# Patient Record
Sex: FEMALE | Race: OTHER | HISPANIC OR LATINO | URBAN - METROPOLITAN AREA
[De-identification: names, ages, dates, MRNs, and addresses within clinical notes are randomized per-mention and may not be internally consistent; named-entity substitution may affect disease eponyms.]

---

## 2017-10-24 ENCOUNTER — EMERGENCY (EMERGENCY)
Facility: HOSPITAL | Age: 47
LOS: 1 days | Discharge: HOME | End: 2017-10-24
Attending: EMERGENCY MEDICINE | Admitting: EMERGENCY MEDICINE
Payer: MEDICAID

## 2017-10-24 VITALS
OXYGEN SATURATION: 100 % | DIASTOLIC BLOOD PRESSURE: 45 MMHG | HEART RATE: 62 BPM | RESPIRATION RATE: 16 BRPM | TEMPERATURE: 98 F | SYSTOLIC BLOOD PRESSURE: 112 MMHG

## 2017-10-24 DIAGNOSIS — Z98.82 BREAST IMPLANT STATUS: Chronic | ICD-10-CM

## 2017-10-24 LAB
HIV1 AG SER QL: SIGNIFICANT CHANGE UP
HIV1+2 AB SPEC QL: SIGNIFICANT CHANGE UP

## 2017-10-24 PROCEDURE — 73030 X-RAY EXAM OF SHOULDER: CPT | Mod: 26,LT

## 2017-10-24 PROCEDURE — 93010 ELECTROCARDIOGRAM REPORT: CPT

## 2017-10-24 PROCEDURE — 99284 EMERGENCY DEPT VISIT MOD MDM: CPT | Mod: 25

## 2017-10-24 RX ORDER — IBUPROFEN 200 MG
600 TABLET ORAL ONCE
Qty: 0 | Refills: 0 | Status: COMPLETED | OUTPATIENT
Start: 2017-10-24 | End: 2017-10-24

## 2017-10-24 RX ORDER — ACETAMINOPHEN 500 MG
975 TABLET ORAL ONCE
Qty: 0 | Refills: 0 | Status: COMPLETED | OUTPATIENT
Start: 2017-10-24 | End: 2017-10-24

## 2017-10-24 RX ADMIN — Medication 600 MILLIGRAM(S): at 12:39

## 2017-10-24 RX ADMIN — Medication 975 MILLIGRAM(S): at 12:39

## 2017-10-24 NOTE — ED PROVIDER NOTE - MEDICAL DECISION MAKING DETAILS
47yoF LHD pt sent from urgent care for concern of left shoulder pain with mild radiation to chest. I do not believe that this is cardiac in nature, screening EKG performed was non-concerning and the patient's history is musculoskeletal in nature. Exam elevates concern for possible ac seperation vs msk strain/sprain vs ligamentous injury. XR to r/o seperations / bony abnormality, will give f/u ortho for ongoing complaints, APAP and ibuprofen here for pain control. Patient consents to HIV testing and this is performed. 46 yo F LHD pt sent from urgent care for concern of left shoulder pain with mild radiation to chest. I do not believe that this is cardiac in nature, screening EKG performed was non-concerning and the patient's history is musculoskeletal in nature. Exam elevates concern for possible ac seperation vs msk strain/sprain vs ligamentous injury. XR to r/o separation / bony abnormality, will give f/u ortho for ongoing complaints, APAP and ibuprofen here for pain control. Patient consents to HIV testing and this is performed.

## 2017-10-24 NOTE — ED PROVIDER NOTE - PROGRESS NOTE DETAILS
Pt reports that her shoulder feels significantly improved with the APAP / ibuprofen. HIV results still not in, pt reports that she would prefer to leave and can callback as needed for results. CELIO Márquez PGY1 Pt reports that her shoulder feels significantly improved with the APAP / ibuprofen. HIV results still not in, pt reports that she would prefer to leave and can callback as needed for results. Xrays result negative for acute pathology. Will give f/u ortho. CELIO Márquez PGY1 HIV results negative, called in result to the patient. Pt is Afghan speaking, results relayed to  which pt had reported would be okay to do. CELIO Márquez PGY1

## 2017-10-24 NOTE — ED PROVIDER NOTE - MUSCULOSKELETAL MINIMAL EXAM
left shoulder with anterior tenderness as well as tenderness to palpation of the deltoid diffusely. There is AC joint tenderness to palp, but no feeling of seperation./normal range of motion/TENDERNESS/atraumatic

## 2017-10-24 NOTE — ED ADULT TRIAGE NOTE - CHIEF COMPLAINT QUOTE
pt with a c/o chest pain radiating to the the left arm. pt sent from urgent care for further evaluation.

## 2017-10-24 NOTE — ED PROVIDER NOTE - OBJECTIVE STATEMENT
L shoulder pain 4-5 days onset after twisting motion of her core. Notes that it is worse with movement and lifting at the shoulder. There is mild radiation towards her upper shoulder and left chest. She *** Left hand dominant pt presenting for shoulder pain 4-5 days onset after twisting motion of her core. Notes that it is worse with movement and lifting at the shoulder. There is mild radiation towards her upper shoulder and left chest. She has not had worsening of the pain with walking or moving her legs. She does not have worsened pain with breathing or deep breathing. She has not felt light headed or had any leg pain. She notes that the pain is worsened when she lifts her left arm, especially laterally. She has not been able to lift heavy objects second to pain. She denies trauma to the arm and has not felt as though her shoulder dislocated. She has been able to move the arm but with significant pain.     Patient is [place] speaking, her  is translating over the phone, he speaks fluent English and Uzbek. Left hand dominant pt presenting for shoulder pain 4-5 days onset after twisting motion of her core. Notes that it is worse with movement and lifting at the shoulder. There is mild radiation towards her upper shoulder and left chest. She has not had worsening of the pain with walking or moving her legs. She does not have worsened pain with breathing or deep breathing. She has not felt light headed or had any leg pain. She notes that the pain is worsened when she lifts her left arm, especially laterally. She has not been able to lift heavy objects second to pain. She denies trauma to the arm and has not felt as though her shoulder dislocated. She has been able to move the arm but with significant pain.   Patient is [place] speaking, her  is translating over the phone, he speaks fluent English and [place].

## 2017-10-24 NOTE — ED PROVIDER NOTE - ATTENDING CONTRIBUTION TO CARE
ED Attending Dr. Payton: 46 yo left hand dominant female with no sig PMH in ED with acute onset left shoulder pain beginning after twisting movement 5 days ago.  Pain radiates from left shoulder into left neck/chest but pt denies chest pain.  Pain worse with abducting left UE above 90 degrees.  No associated SOB, diaphoresis, dizziness or N/V/D.  Pt responding well to ibuprofen at home but recently stopped taking it and pain returned.  On exam pt well appearing, in NAD, heart RRR, lungs CTAB, abd NTND, extremities without swelling, strength 5/5 in all extremities and skin without rash.  Left shoulder without deformity but TTP overlying approx ?AC joint and pain significantly worse with abduction above 90 degrees, although ROM intact with pain.  Left lateral paracervical neck musculature TTP but no midline neck TTP.  EKG WNL.  Consistent with musculoskeletal pain, very low suspicion for ACS (no known risk factors [pt adopted and does not know FHx]).  Will get xray to eval for AC separation, give NSAIDs, re-eval.  Pt offered translation services but requesting  to translate via telephone.

## 2017-10-24 NOTE — ED PROVIDER NOTE - PLAN OF CARE
Take acetaminophen (Tylenol) 650mg (2 tablets) up to 4 times per day as needed for pain. Never take more than 3000mg of acetaminophen/Tylenol in any 24 hour period. Take ibuprofen (or Motrin) 600mg (3 tablets) up to 4 times per day as needed for pain with food or milk. Follow up with the orthopedics clinic for further evaluation as needed. Return here to the emergency department for any new concerns.

## 2017-10-24 NOTE — ED PROVIDER NOTE - CARE PLAN
Principal Discharge DX:	Acute pain of left shoulder Principal Discharge DX:	Acute pain of left shoulder  Instructions for follow-up, activity and diet:	Take acetaminophen (Tylenol) 650mg (2 tablets) up to 4 times per day as needed for pain. Never take more than 3000mg of acetaminophen/Tylenol in any 24 hour period. Take ibuprofen (or Motrin) 600mg (3 tablets) up to 4 times per day as needed for pain with food or milk. Follow up with the orthopedics clinic for further evaluation as needed. Return here to the emergency department for any new concerns.

## 2017-10-24 NOTE — ED ADULT NURSE REASSESSMENT NOTE - NS ED NURSE REASSESS COMMENT FT1
Intake pt coming with left arm and shoulder pain x 2-3 days pt stated was lifting during the weekend denies trauma, denies numbness/weakness to upper extremities pt ambulatory to ER.  Medicated as ordered pending results.    Agustina Rayo RN

## 2018-01-30 ENCOUNTER — EMERGENCY (EMERGENCY)
Facility: HOSPITAL | Age: 48
LOS: 1 days | Discharge: HOME | End: 2018-01-30
Attending: EMERGENCY MEDICINE | Admitting: EMERGENCY MEDICINE
Payer: MEDICAID

## 2018-01-30 VITALS
TEMPERATURE: 98 F | RESPIRATION RATE: 18 BRPM | OXYGEN SATURATION: 100 % | SYSTOLIC BLOOD PRESSURE: 98 MMHG | HEART RATE: 80 BPM | DIASTOLIC BLOOD PRESSURE: 62 MMHG

## 2018-01-30 VITALS
SYSTOLIC BLOOD PRESSURE: 107 MMHG | OXYGEN SATURATION: 100 % | DIASTOLIC BLOOD PRESSURE: 58 MMHG | HEART RATE: 78 BPM | RESPIRATION RATE: 16 BRPM | TEMPERATURE: 98 F

## 2018-01-30 DIAGNOSIS — Z98.51 TUBAL LIGATION STATUS: Chronic | ICD-10-CM

## 2018-01-30 DIAGNOSIS — Z98.82 BREAST IMPLANT STATUS: Chronic | ICD-10-CM

## 2018-01-30 LAB
ALBUMIN SERPL ELPH-MCNC: 4.2 G/DL — SIGNIFICANT CHANGE UP (ref 3.3–5)
ALP SERPL-CCNC: 46 U/L — SIGNIFICANT CHANGE UP (ref 40–120)
ALT FLD-CCNC: 14 U/L — SIGNIFICANT CHANGE UP (ref 4–33)
APPEARANCE UR: SIGNIFICANT CHANGE UP
AST SERPL-CCNC: 18 U/L — SIGNIFICANT CHANGE UP (ref 4–32)
BILIRUB SERPL-MCNC: < 0.2 MG/DL — LOW (ref 0.2–1.2)
BILIRUB UR-MCNC: NEGATIVE — SIGNIFICANT CHANGE UP
BLOOD UR QL VISUAL: NEGATIVE — SIGNIFICANT CHANGE UP
BUN SERPL-MCNC: 14 MG/DL — SIGNIFICANT CHANGE UP (ref 7–23)
CALCIUM SERPL-MCNC: 8.7 MG/DL — SIGNIFICANT CHANGE UP (ref 8.4–10.5)
CHLORIDE SERPL-SCNC: 103 MMOL/L — SIGNIFICANT CHANGE UP (ref 98–107)
CO2 SERPL-SCNC: 23 MMOL/L — SIGNIFICANT CHANGE UP (ref 22–31)
COLOR SPEC: SIGNIFICANT CHANGE UP
CREAT SERPL-MCNC: 0.59 MG/DL — SIGNIFICANT CHANGE UP (ref 0.5–1.3)
GLUCOSE SERPL-MCNC: 91 MG/DL — SIGNIFICANT CHANGE UP (ref 70–99)
GLUCOSE UR-MCNC: NEGATIVE — SIGNIFICANT CHANGE UP
HCT VFR BLD CALC: 39.4 % — SIGNIFICANT CHANGE UP (ref 34.5–45)
HGB BLD-MCNC: 13.3 G/DL — SIGNIFICANT CHANGE UP (ref 11.5–15.5)
HIV1 AG SER QL: SIGNIFICANT CHANGE UP
HIV1+2 AB SPEC QL: SIGNIFICANT CHANGE UP
KETONES UR-MCNC: NEGATIVE — SIGNIFICANT CHANGE UP
LEUKOCYTE ESTERASE UR-ACNC: HIGH
LIDOCAIN IGE QN: 42.7 U/L — SIGNIFICANT CHANGE UP (ref 7–60)
MCHC RBC-ENTMCNC: 29.6 PG — SIGNIFICANT CHANGE UP (ref 27–34)
MCHC RBC-ENTMCNC: 33.8 % — SIGNIFICANT CHANGE UP (ref 32–36)
MCV RBC AUTO: 87.6 FL — SIGNIFICANT CHANGE UP (ref 80–100)
MUCOUS THREADS # UR AUTO: SIGNIFICANT CHANGE UP
NITRITE UR-MCNC: NEGATIVE — SIGNIFICANT CHANGE UP
NRBC # FLD: 0 — SIGNIFICANT CHANGE UP
PH UR: 5.5 — SIGNIFICANT CHANGE UP (ref 4.6–8)
PLATELET # BLD AUTO: 185 K/UL — SIGNIFICANT CHANGE UP (ref 150–400)
PMV BLD: 11.3 FL — SIGNIFICANT CHANGE UP (ref 7–13)
POTASSIUM SERPL-MCNC: 3.8 MMOL/L — SIGNIFICANT CHANGE UP (ref 3.5–5.3)
POTASSIUM SERPL-SCNC: 3.8 MMOL/L — SIGNIFICANT CHANGE UP (ref 3.5–5.3)
PROT SERPL-MCNC: 6.8 G/DL — SIGNIFICANT CHANGE UP (ref 6–8.3)
PROT UR-MCNC: NEGATIVE MG/DL — SIGNIFICANT CHANGE UP
RBC # BLD: 4.5 M/UL — SIGNIFICANT CHANGE UP (ref 3.8–5.2)
RBC # FLD: 13.5 % — SIGNIFICANT CHANGE UP (ref 10.3–14.5)
RBC CASTS # UR COMP ASSIST: SIGNIFICANT CHANGE UP (ref 0–?)
SODIUM SERPL-SCNC: 139 MMOL/L — SIGNIFICANT CHANGE UP (ref 135–145)
SP GR SPEC: 1.02 — SIGNIFICANT CHANGE UP (ref 1–1.04)
SQUAMOUS # UR AUTO: SIGNIFICANT CHANGE UP
UROBILINOGEN FLD QL: NORMAL MG/DL — SIGNIFICANT CHANGE UP
WBC # BLD: 6.16 K/UL — SIGNIFICANT CHANGE UP (ref 3.8–10.5)
WBC # FLD AUTO: 6.16 K/UL — SIGNIFICANT CHANGE UP (ref 3.8–10.5)
WBC UR QL: SIGNIFICANT CHANGE UP (ref 0–?)

## 2018-01-30 PROCEDURE — 76830 TRANSVAGINAL US NON-OB: CPT | Mod: 26

## 2018-01-30 PROCEDURE — 76705 ECHO EXAM OF ABDOMEN: CPT | Mod: 26

## 2018-01-30 PROCEDURE — 99285 EMERGENCY DEPT VISIT HI MDM: CPT | Mod: 25

## 2018-01-30 PROCEDURE — 74177 CT ABD & PELVIS W/CONTRAST: CPT | Mod: 26

## 2018-01-30 RX ORDER — ONDANSETRON 8 MG/1
4 TABLET, FILM COATED ORAL ONCE
Qty: 0 | Refills: 0 | Status: COMPLETED | OUTPATIENT
Start: 2018-01-30 | End: 2018-01-30

## 2018-01-30 RX ORDER — MORPHINE SULFATE 50 MG/1
4 CAPSULE, EXTENDED RELEASE ORAL ONCE
Qty: 0 | Refills: 0 | Status: DISCONTINUED | OUTPATIENT
Start: 2018-01-30 | End: 2018-01-30

## 2018-01-30 RX ORDER — SODIUM CHLORIDE 9 MG/ML
1000 INJECTION INTRAMUSCULAR; INTRAVENOUS; SUBCUTANEOUS ONCE
Qty: 0 | Refills: 0 | Status: COMPLETED | OUTPATIENT
Start: 2018-01-30 | End: 2018-01-30

## 2018-01-30 RX ADMIN — MORPHINE SULFATE 4 MILLIGRAM(S): 50 CAPSULE, EXTENDED RELEASE ORAL at 13:32

## 2018-01-30 RX ADMIN — SODIUM CHLORIDE 1000 MILLILITER(S): 9 INJECTION INTRAMUSCULAR; INTRAVENOUS; SUBCUTANEOUS at 15:24

## 2018-01-30 RX ADMIN — ONDANSETRON 4 MILLIGRAM(S): 8 TABLET, FILM COATED ORAL at 13:32

## 2018-01-30 NOTE — ED ADULT NURSE NOTE - OBJECTIVE STATEMENT
pt c/o RLQ pain x 9 days. Was seen at another hospital and treated for UTI , as per pt course of antibiotics completed with no relief of pain.

## 2018-01-30 NOTE — ED SUB INTERN NOTE - OBJECTIVE STATEMENT FT
Pt is 48 yo female with h/o tubal ligation and no significant PMH, p/w R-sided abdominal pain Pt is 48 yo female with h/o tubal ligation and no significant PMH, p/w R-sided abdominal pain x 6 days that is worse today. The pt started having the RUQ and RLQ pain last thursday and went to the ED. She was discharged with the dx of UTI and was given antibiotics. Her pain persisted and worsened today with nausea but no vomiting. She describes it as a 7/10 stabbing pain that is worsen with movement and palpation, radiating to the back. She endorses having fever for the past 3 days and has been having flu-like illness for 9 days. She has also been constipated for a few days, with only a small amount of BM this morning. Pt denies dyspareunia, vaginal discharge, and CP.

## 2018-01-30 NOTE — ED SUB INTERN NOTE - MEDICAL DECISION MAKING DETAILS
Pt p/w diffuse R abdominal pain x 6 days. Given benign pelvic exam, the likelihood of Gyn diseases such as ovarian cysts, torsion, rupture is lower. RUQ U/S is not indicative of cholecystitis or cholelithiasis. Appendicitis should be r/o with CT abd w/ PO cont although the longer time course and diffuse nature of her pain are not indicative. Pt p/w diffuse R abdominal pain x 6 days. Given benign pelvic exam, the likelihood of Gyn diseases such as ovarian cysts, torsion, rupture is lower. RUQ U/S is not indicative of cholecystitis or cholelithiasis. Appendicitis should be r/o with CT abd w/ PO cont although the longer time course and diffuse nature of her pain are not indicative. Will do UA and pregnancy test to r/o UTI and ectopic pregnancy.

## 2018-01-30 NOTE — ED PROVIDER NOTE - PROGRESS NOTE DETAILS
pt with CT  and pelvic US negative for acute pathology  leimyomatous uterus noted  Exam, with minimal Low abd tenderness  no guarding or rebound  will follow up with PMD results of labs and scans given

## 2018-01-30 NOTE — ED PROCEDURE NOTE - PROCEDURE ADDITIONAL DETAILS
Focused ED Ultrasound RUQ [id number]  Grey scale RUQ views obtained in saggital and transverse planes.   No wall thickening, no gallbladder wall edema, and no pericholecystic fluid seen.  Negative sonographic hughes's.  No gallstones.    anterior gallbladder wall - 1.9mm  CBD -  4.1mm    Impression:  normal gallbladder.  Borderline dilated CBD but no stones seen in GB or visualized portion of CBD.    Juan Pablos

## 2018-01-30 NOTE — ED PROVIDER NOTE - OBJECTIVE STATEMENT
47 year old c/o flu like illness started 9 days ago with HA. fever, congestion. 5 days ago c/o right sided abd pain with nausea, saw PMD got treated for UTI, now worsened pain rad to back and RUQ 7/10, small BM today, no vag d/c no dyspareunia, S/P tubal ligation.  feels like she has a fever, no vomiting or diarrhea. Appetite nml. 47 year old c/o flu like illness started 9 days ago with HA. fever, congestion. 5 days ago c/o right sided abd pain with nausea, saw PMD got treated for UTI, now worsened pain rad to back and RUQ 7/10, small BM today, no vag d/c no dyspareunia, S/P tubal ligation.  feels like she has a fever, no vomiting or diarrhea. Appetite nml.  # [id number]

## 2018-01-30 NOTE — ED ADULT TRIAGE NOTE - CHIEF COMPLAINT QUOTE
pt amb to triage c/o RLQ pain, as per PMD noted recent dx UTI by ED w/ abx w/ no relief, advised to come to ED for r/o appi 2/2 + rebound tenderness, pt states pain getting worse since onset

## 2023-01-16 NOTE — ED PROVIDER NOTE - GASTROINTESTINAL, MLM
